# Patient Record
Sex: FEMALE | Race: WHITE | NOT HISPANIC OR LATINO | Employment: FULL TIME | URBAN - NONMETROPOLITAN AREA
[De-identification: names, ages, dates, MRNs, and addresses within clinical notes are randomized per-mention and may not be internally consistent; named-entity substitution may affect disease eponyms.]

---

## 2019-06-03 ENCOUNTER — OFFICE VISIT (OUTPATIENT)
Dept: PODIATRY | Facility: CLINIC | Age: 44
End: 2019-06-03

## 2019-06-03 VITALS — WEIGHT: 240.2 LBS | OXYGEN SATURATION: 96 % | BODY MASS INDEX: 38.6 KG/M2 | HEIGHT: 66 IN | HEART RATE: 106 BPM

## 2019-06-03 DIAGNOSIS — M79.672 LEFT FOOT PAIN: Primary | ICD-10-CM

## 2019-06-03 DIAGNOSIS — M85.872 OSTEOPENIA OF LEFT FOOT: ICD-10-CM

## 2019-06-03 DIAGNOSIS — R52 PAIN: ICD-10-CM

## 2019-06-03 DIAGNOSIS — R93.7 BONE MARROW EDEMA: ICD-10-CM

## 2019-06-03 PROCEDURE — 99204 OFFICE O/P NEW MOD 45 MIN: CPT | Performed by: PODIATRIST

## 2019-06-03 RX ORDER — ESTRADIOL 1 MG/1
TABLET ORAL
Refills: 3 | COMMUNITY
Start: 2019-05-09

## 2019-06-03 NOTE — PROGRESS NOTES
Vicky Barajas  1975  43 y.o. female     Patient presents to clinic today with complaint of left foot pain.    2019  Chief Complaint   Patient presents with   • Left Foot - Pain           History of Present Illness    Vicky Barajas is a 43 y.o. female who presents for evaluation of left foot and ankle pain.  Patient states that this pain has been ongoing since December of last year.  She describes the pain is achy and at times throbbing.  The pain is worse with weightbearing and activity.  She denies any known trauma or injuries.  She did initially see an orthopedist in [place] who put her into an offloading cam boot and sent her for an MRI.  She does have her MRI with her today.  She states that she was told she had inflammation in her bones and was placed nonweightbearing for a few weeks on a knee scooter and subsequently referred to [facility] when her pain did not improve.  She was then transition to a tall cam boot but none of her symptoms improved.  She also took a Medrol Dosepak and was placed on one nonsteroidal anti-inflammatory again with no improvement of her symptoms.  She denies any other prior treatments.      Past Medical History:   Diagnosis Date   • Acid reflux    • Allergic    • Bronchitis, chronic (CMS/HCC)    • Callus    • Kidney stone    • Migraines    • Sinusitis    • Strep throat    • Urinary tract infection          Past Surgical History:   Procedure Laterality Date   •  SECTION     • HYSTERECTOMY           Family History   Problem Relation Age of Onset   • Heart disease Father    • Hypertension Father    • Hypertension Brother    • Diabetes Paternal Grandmother    • Cancer Other          Social History     Socioeconomic History   • Marital status: Single     Spouse name: Not on file   • Number of children: Not on file   • Years of education: Not on file   • Highest education level: Not on file   Tobacco Use   • Smoking status: Never Smoker         Current Outpatient  "Medications   Medication Sig Dispense Refill   • estradiol (ESTRACE) 1 MG tablet   3     No current facility-administered medications for this visit.          OBJECTIVE    Pulse 106   Ht 167.6 cm (66\")   Wt 109 kg (240 lb 3.2 oz)   SpO2 96%   BMI 38.77 kg/m²       Review of Systems   Constitutional: Negative.    HENT: Negative.    Eyes: Negative.    Respiratory: Negative.    Cardiovascular: Negative.    Gastrointestinal: Negative.    Endocrine: Negative.    Genitourinary: Negative.    Musculoskeletal:        Foot pain   Skin: Negative.    Allergic/Immunologic: Negative.    Neurological: Negative.    Hematological: Negative.    Psychiatric/Behavioral: Negative.          Physical Exam   Eyes: EOM are normal. Pupils are equal, round, and reactive to light.   Neck: Neck supple.   Abdominal: Soft. She exhibits no distension.   Lymphadenopathy:     She has no cervical adenopathy.   Constitutional: she appears well-developed and well-nourished.   HEENT: Normocephalic. Atraumatic.  CV: No CP. RRR  Resp: Non-labored respirations.  Psychiatric: she has a normal mood and affect. her behavior is normal.         Lower Extremity Exam:  Vascular: DP/PT pulses palpable 2+.   No edema  Foot warm  Neuro: Protective sensation intact, b/l.  DTRs intact  Negative Tinel over tarsal tunnel  Integument: No open wounds or lesions.  No erythema, scaling  No masses  No ecchymosis  Musculoskeletal: LE muscle strength 5/5.   Gait normal  Ankle ROM decreased without pain or crepitus  STJ ROM full without pain or crepitus  Peroneals, posterior tibial and Achilles tendons are intact without significant tenderness palpation.  Tenderness palpation of talar neck, talar dome with foot held in plantarflexion.  No crepitus or instability noted.  Mild tenderness to palpation of plantar arch.  No significant tenderness to plantar calcaneal tubercles.              ASSESSMENT AND PLAN    Vicky was seen today for pain.    Diagnoses and all orders for " this visit:    Left foot pain    Pain  -     XR Foot 3+ View Left    Bone marrow edema  -     DEXA Bone Density Axial; Future    Osteopenia of left foot  -     DEXA Bone Density Axial; Future      -Comprehensive foot and ankle exam.  -Prior MRI images from January 2019 as well as radiographs today were reviewed.  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  -Continue with Cam boot, NSAIDs for symptomatic relief as needed.  -We will obtain DEXA scan to rule out underlying bone density pathology.  -Consider bisphosphonate if symptoms fail to improve.  -Recheck following DEXA scan.          This document has been electronically signed by Shay Caba DPM on Juana 3, 2019 4:51 PM     EMR Dragon/Transcription disclaimer:   Much of this encounter note is an electronic transcription/translation of spoken language to printed text. The electronic translation of spoken language may permit erroneous, or at times, nonsensical words or phrases to be inadvertently transcribed; Although I have reviewed the note for such errors, some may still exist.    Shay Caba DPM  6/3/2019  4:51 PM

## 2019-08-08 ENCOUNTER — OFFICE VISIT (OUTPATIENT)
Dept: PODIATRY | Facility: CLINIC | Age: 44
End: 2019-08-08

## 2019-08-08 VITALS — WEIGHT: 240.2 LBS | BODY MASS INDEX: 38.6 KG/M2 | OXYGEN SATURATION: 98 % | HEART RATE: 81 BPM | HEIGHT: 66 IN

## 2019-08-08 DIAGNOSIS — M79.672 LEFT FOOT PAIN: ICD-10-CM

## 2019-08-08 DIAGNOSIS — M85.872 OSTEOPENIA OF LEFT FOOT: ICD-10-CM

## 2019-08-08 DIAGNOSIS — R93.7 BONE MARROW EDEMA: Primary | ICD-10-CM

## 2019-08-08 PROCEDURE — 99213 OFFICE O/P EST LOW 20 MIN: CPT | Performed by: PODIATRIST

## 2019-08-08 RX ORDER — MELOXICAM 15 MG/1
15 TABLET ORAL DAILY
Qty: 30 TABLET | Refills: 0 | Status: SHIPPED | OUTPATIENT
Start: 2019-08-08

## 2019-08-08 NOTE — PROGRESS NOTES
"Vicky Barajas  1975  44 y.o. female     Patient presents to clinic today with complaint of left foot pain.    2019    Chief Complaint   Patient presents with   • Left Foot - Follow-up           History of Present Illness    Vicky Barajas is a 44 y.o. female who presents for follow-up evaluation of chronic left foot pain and abnormal bone marrow edema seen on MRI.  She has had DEXA scan since last visit.  She does note that her pain is still present however seems mildly improved overall.    Past Medical History:   Diagnosis Date   • Acid reflux    • Allergic    • Bronchitis, chronic (CMS/HCC)    • Callus    • Kidney stone    • Migraines    • Sinusitis    • Strep throat    • Urinary tract infection          Past Surgical History:   Procedure Laterality Date   •  SECTION     • HYSTERECTOMY           Family History   Problem Relation Age of Onset   • Heart disease Father    • Hypertension Father    • Hypertension Brother    • Diabetes Paternal Grandmother    • Cancer Other          Social History     Socioeconomic History   • Marital status: Single     Spouse name: Not on file   • Number of children: Not on file   • Years of education: Not on file   • Highest education level: Not on file   Tobacco Use   • Smoking status: Never Smoker         Current Outpatient Medications   Medication Sig Dispense Refill   • estradiol (ESTRACE) 1 MG tablet   3   • meloxicam (MOBIC) 15 MG tablet Take 1 tablet by mouth Daily. Take once daily. 30 tablet 0     No current facility-administered medications for this visit.          OBJECTIVE    Pulse 81   Ht 167.6 cm (66\")   Wt 109 kg (240 lb 3.2 oz)   SpO2 98%   BMI 38.77 kg/m²       Review of Systems   Constitutional: Negative.    HENT: Negative.    Eyes: Negative.    Respiratory: Negative.    Cardiovascular: Negative.    Gastrointestinal: Negative.    Endocrine: Negative.    Genitourinary: Negative.    Musculoskeletal:        Foot pain   Skin: Negative.  "   Allergic/Immunologic: Negative.    Neurological: Negative.    Hematological: Negative.    Psychiatric/Behavioral: Negative.          Physical Exam   Eyes: EOM are normal. Pupils are equal, round, and reactive to light.   Neck: Neck supple.   Abdominal: Soft. She exhibits no distension.   Lymphadenopathy:     She has no cervical adenopathy.   Constitutional: she appears well-developed and well-nourished.   HEENT: Normocephalic. Atraumatic.  CV: No CP. RRR  Resp: Non-labored respirations.  Psychiatric: she has a normal mood and affect. her behavior is normal.         Lower Extremity Exam:  Vascular: DP/PT pulses palpable 2+.   No edema  Foot warm  Neuro: Protective sensation intact, b/l.  DTRs intact  Negative Tinel over tarsal tunnel  Integument: No open wounds or lesions.  No erythema, scaling  No masses  No ecchymosis  Musculoskeletal: LE muscle strength 5/5.   Gait normal  Ankle ROM decreased without pain or crepitus  STJ ROM full without pain or crepitus  Peroneals, posterior tibial and Achilles tendons are intact without significant tenderness palpation.  Tenderness palpation of dorsal lateral midfoot.  No crepitus or instability noted.  Improving compared to last exam  Mild tenderness to palpation of plantar arch.  No significant tenderness to plantar calcaneal tubercles.      PROCEDURE: DEXA SCAN     CLINICAL INDICATION: Bone marrow edema syndrome, left foot and  ankle. No prior study, D75.89 Other specified diseases of blood  and blood-forming organs M85.872 Other specified disorders of  bone density and structure, left ankle and foot     COMPARISON: None     PROCEDURE/TECHNIQUE:  Multiple images of the bilateral hip and lumbar spine were  obtained using dual absorption technique.     FINDINGS:  The exam is performed using the [employer] QDR-4500 SL unit.  Images  are of satisfactory quality.     Total Lumbar spine:   1.021  gm/cm2     T-Score -0.2      Total Left Hip femoral neck:            0.763  gm/cm2      T-Score  -0.8   Total Right Hip femoral neck:            0.786  gm/cm2      T-Score -0.6      IMPRESSION:  CONCLUSION: The patient has normal bone mineral density.        WORLD HEALTH ORGANIZATION CRITERIA FOR OSTEOPOROSIS     DIAGNOSTIC CATEGORY    T-SCORE  Normal......................................................0 to  - 1.0  Osteopenia..............................................-1.0 to  -2.5  Osteoporosis...........................................Greater  than -2.5 (no history of fragility fractures)*  Established Osteoporosis........................Greater than -2.5  (with history of fragility fractures)*     *FRAGILITY FRACTURES: VERTEBRAE, HUMERUS, WRIST, HIP (OVER 40  YEARS OF AGE)     Electronically signed by:  Az Barksdale MD  7/11/2019 12:48 PM CDT  Workstation: [id number]        ASSESSMENT AND PLAN    Ginger was seen today for follow-up.    Diagnoses and all orders for this visit:    Bone marrow edema    Osteopenia of left foot    Left foot pain    Other orders  -     meloxicam (MOBIC) 15 MG tablet; Take 1 tablet by mouth Daily. Take once daily.      -Comprehensive foot and ankle exam.  -Prior MRI images from January 2019 as well as radiographs today were reviewed.  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  -DEXA scan negative for generalized osteoporosis.  No indication for bisphosphonates at this time.  Continue vitamin D/calcium supplementation  -Will add meloxicam daily for treatment of underlying osteoarthritis  -Activity as tolerated  -Recheck as needed            This document has been electronically signed by Shay Caba DPM on August 13, 2019 7:50 AM     EMR Dragon/Transcription disclaimer:   Much of this encounter note is an electronic transcription/translation of spoken language to printed text. The electronic translation of spoken language may permit erroneous, or at times, nonsensical words or phrases to be inadvertently  transcribed; Although I have reviewed the note for such errors, some may still exist.    Shay Caba DPM  8/13/2019  7:50 AM

## 2021-02-16 ENCOUNTER — HOSPITAL ENCOUNTER (EMERGENCY)
Facility: HOSPITAL | Age: 46
Discharge: HOME | End: 2021-02-16
Attending: EMERGENCY MEDICINE | Admitting: STUDENT IN AN ORGANIZED HEALTH CARE EDUCATION/TRAINING PROGRAM

## 2021-02-16 ENCOUNTER — APPOINTMENT (OUTPATIENT)
Dept: CT IMAGING | Facility: HOSPITAL | Age: 46
End: 2021-02-16

## 2021-02-16 VITALS
HEIGHT: 64 IN | DIASTOLIC BLOOD PRESSURE: 87 MMHG | TEMPERATURE: 97.9 F | RESPIRATION RATE: 18 BRPM | HEART RATE: 81 BPM | SYSTOLIC BLOOD PRESSURE: 184 MMHG | WEIGHT: 262.8 LBS | OXYGEN SATURATION: 97 % | BODY MASS INDEX: 44.86 KG/M2

## 2021-02-16 DIAGNOSIS — N13.30 HYDROURETERONEPHROSIS: Primary | ICD-10-CM

## 2021-02-16 DIAGNOSIS — N20.0 KIDNEY STONE: ICD-10-CM

## 2021-02-16 LAB
ALBUMIN SERPL-MCNC: 4.2 G/DL (ref 3.5–5.2)
ALBUMIN/GLOB SERPL: 1.4 G/DL
ALP SERPL-CCNC: 130 U/L (ref 39–117)
ALT SERPL W P-5'-P-CCNC: 18 U/L (ref 1–33)
ANION GAP SERPL CALCULATED.3IONS-SCNC: 8 MMOL/L (ref 5–15)
AST SERPL-CCNC: 16 U/L (ref 1–32)
BACTERIA UR QL AUTO: ABNORMAL /HPF
BASOPHILS # BLD AUTO: 0.09 10*3/MM3 (ref 0–0.2)
BASOPHILS NFR BLD AUTO: 0.6 % (ref 0–1.5)
BILIRUB SERPL-MCNC: 0.2 MG/DL (ref 0–1.2)
BILIRUB UR QL STRIP: NEGATIVE
BUN SERPL-MCNC: 12 MG/DL (ref 6–20)
BUN/CREAT SERPL: 13.8 (ref 7–25)
CALCIUM SPEC-SCNC: 9.5 MG/DL (ref 8.6–10.5)
CHLORIDE SERPL-SCNC: 100 MMOL/L (ref 98–107)
CLARITY UR: CLEAR
CO2 SERPL-SCNC: 29 MMOL/L (ref 22–29)
COLOR UR: YELLOW
CREAT SERPL-MCNC: 0.87 MG/DL (ref 0.57–1)
DEPRECATED RDW RBC AUTO: 42.4 FL (ref 37–54)
EOSINOPHIL # BLD AUTO: 0.08 10*3/MM3 (ref 0–0.4)
EOSINOPHIL NFR BLD AUTO: 0.6 % (ref 0.3–6.2)
ERYTHROCYTE [DISTWIDTH] IN BLOOD BY AUTOMATED COUNT: 12.8 % (ref 12.3–15.4)
GFR SERPL CREATININE-BSD FRML MDRD: 71 ML/MIN/1.73
GLOBULIN UR ELPH-MCNC: 3.1 GM/DL
GLUCOSE SERPL-MCNC: 155 MG/DL (ref 65–99)
GLUCOSE UR STRIP-MCNC: ABNORMAL MG/DL
HCT VFR BLD AUTO: 42.9 % (ref 34–46.6)
HGB BLD-MCNC: 14.5 G/DL (ref 12–15.9)
HGB UR QL STRIP.AUTO: ABNORMAL
HOLD SPECIMEN: NORMAL
HOLD SPECIMEN: NORMAL
HYALINE CASTS UR QL AUTO: ABNORMAL /LPF
IMM GRANULOCYTES # BLD AUTO: 0.06 10*3/MM3 (ref 0–0.05)
IMM GRANULOCYTES NFR BLD AUTO: 0.4 % (ref 0–0.5)
KETONES UR QL STRIP: NEGATIVE
LEUKOCYTE ESTERASE UR QL STRIP.AUTO: NEGATIVE
LIPASE SERPL-CCNC: 23 U/L (ref 13–60)
LYMPHOCYTES # BLD AUTO: 1.94 10*3/MM3 (ref 0.7–3.1)
LYMPHOCYTES NFR BLD AUTO: 14 % (ref 19.6–45.3)
MCH RBC QN AUTO: 30.5 PG (ref 26.6–33)
MCHC RBC AUTO-ENTMCNC: 33.8 G/DL (ref 31.5–35.7)
MCV RBC AUTO: 90.3 FL (ref 79–97)
MONOCYTES # BLD AUTO: 0.68 10*3/MM3 (ref 0.1–0.9)
MONOCYTES NFR BLD AUTO: 4.9 % (ref 5–12)
NEUTROPHILS NFR BLD AUTO: 11.04 10*3/MM3 (ref 1.7–7)
NEUTROPHILS NFR BLD AUTO: 79.5 % (ref 42.7–76)
NITRITE UR QL STRIP: NEGATIVE
NRBC BLD AUTO-RTO: 0 /100 WBC (ref 0–0.2)
PH UR STRIP.AUTO: 7.5 [PH] (ref 5–9)
PLATELET # BLD AUTO: 288 10*3/MM3 (ref 140–450)
PMV BLD AUTO: 11.9 FL (ref 6–12)
POTASSIUM SERPL-SCNC: 4.5 MMOL/L (ref 3.5–5.2)
PROT SERPL-MCNC: 7.3 G/DL (ref 6–8.5)
PROT UR QL STRIP: NEGATIVE
RBC # BLD AUTO: 4.75 10*6/MM3 (ref 3.77–5.28)
RBC # UR: ABNORMAL /HPF
REF LAB TEST METHOD: ABNORMAL
SODIUM SERPL-SCNC: 137 MMOL/L (ref 136–145)
SP GR UR STRIP: 1.09 (ref 1–1.03)
SQUAMOUS #/AREA URNS HPF: ABNORMAL /HPF
UROBILINOGEN UR QL STRIP: ABNORMAL
WBC # BLD AUTO: 13.89 10*3/MM3 (ref 3.4–10.8)
WBC UR QL AUTO: ABNORMAL /HPF
WHOLE BLOOD HOLD SPECIMEN: NORMAL
WHOLE BLOOD HOLD SPECIMEN: NORMAL

## 2021-02-16 PROCEDURE — 96375 TX/PRO/DX INJ NEW DRUG ADDON: CPT

## 2021-02-16 PROCEDURE — 85025 COMPLETE CBC W/AUTO DIFF WBC: CPT | Performed by: EMERGENCY MEDICINE

## 2021-02-16 PROCEDURE — 74177 CT ABD & PELVIS W/CONTRAST: CPT

## 2021-02-16 PROCEDURE — 25010000002 KETOROLAC TROMETHAMINE PER 15 MG: Performed by: EMERGENCY MEDICINE

## 2021-02-16 PROCEDURE — 96376 TX/PRO/DX INJ SAME DRUG ADON: CPT

## 2021-02-16 PROCEDURE — 83690 ASSAY OF LIPASE: CPT | Performed by: EMERGENCY MEDICINE

## 2021-02-16 PROCEDURE — 99284 EMERGENCY DEPT VISIT MOD MDM: CPT

## 2021-02-16 PROCEDURE — 96374 THER/PROPH/DIAG INJ IV PUSH: CPT

## 2021-02-16 PROCEDURE — 25010000002 ONDANSETRON PER 1 MG: Performed by: EMERGENCY MEDICINE

## 2021-02-16 PROCEDURE — 25010000002 IOPAMIDOL 61 % SOLUTION: Performed by: EMERGENCY MEDICINE

## 2021-02-16 PROCEDURE — 81001 URINALYSIS AUTO W/SCOPE: CPT | Performed by: EMERGENCY MEDICINE

## 2021-02-16 PROCEDURE — 80053 COMPREHEN METABOLIC PANEL: CPT | Performed by: EMERGENCY MEDICINE

## 2021-02-16 PROCEDURE — 25010000002 MORPHINE PER 10 MG: Performed by: EMERGENCY MEDICINE

## 2021-02-16 RX ORDER — SODIUM CHLORIDE 0.9 % (FLUSH) 0.9 %
10 SYRINGE (ML) INJECTION AS NEEDED
Status: DISCONTINUED | OUTPATIENT
Start: 2021-02-16 | End: 2021-02-17 | Stop reason: HOSPADM

## 2021-02-16 RX ORDER — ONDANSETRON 4 MG/1
4 TABLET, ORALLY DISINTEGRATING ORAL 4 TIMES DAILY PRN
Qty: 12 TABLET | Refills: 0 | Status: SHIPPED | OUTPATIENT
Start: 2021-02-16

## 2021-02-16 RX ORDER — HYDROCODONE BITARTRATE AND ACETAMINOPHEN 10; 325 MG/1; MG/1
1 TABLET ORAL ONCE
Status: COMPLETED | OUTPATIENT
Start: 2021-02-16 | End: 2021-02-16

## 2021-02-16 RX ORDER — KETOROLAC TROMETHAMINE 10 MG/1
10 TABLET, FILM COATED ORAL EVERY 6 HOURS PRN
Qty: 12 TABLET | Refills: 0 | Status: SHIPPED | OUTPATIENT
Start: 2021-02-16

## 2021-02-16 RX ORDER — ONDANSETRON 2 MG/ML
4 INJECTION INTRAMUSCULAR; INTRAVENOUS ONCE
Status: COMPLETED | OUTPATIENT
Start: 2021-02-16 | End: 2021-02-16

## 2021-02-16 RX ORDER — KETOROLAC TROMETHAMINE 30 MG/ML
30 INJECTION, SOLUTION INTRAMUSCULAR; INTRAVENOUS ONCE
Status: COMPLETED | OUTPATIENT
Start: 2021-02-16 | End: 2021-02-16

## 2021-02-16 RX ADMIN — MORPHINE SULFATE 4 MG: 4 INJECTION INTRAVENOUS at 21:03

## 2021-02-16 RX ADMIN — ONDANSETRON HYDROCHLORIDE 4 MG: 2 INJECTION, SOLUTION INTRAMUSCULAR; INTRAVENOUS at 21:01

## 2021-02-16 RX ADMIN — KETOROLAC TROMETHAMINE 30 MG: 30 INJECTION, SOLUTION INTRAMUSCULAR; INTRAVENOUS at 21:58

## 2021-02-16 RX ADMIN — SODIUM CHLORIDE 1000 ML: 900 INJECTION, SOLUTION INTRAVENOUS at 21:01

## 2021-02-16 RX ADMIN — HYDROCODONE BITARTRATE AND ACETAMINOPHEN 1 TABLET: 10; 325 TABLET ORAL at 23:52

## 2021-02-16 RX ADMIN — MORPHINE SULFATE 4 MG: 4 INJECTION INTRAVENOUS at 21:59

## 2021-02-16 RX ADMIN — IOPAMIDOL 90 ML: 612 INJECTION, SOLUTION INTRAVENOUS at 21:17

## 2021-02-17 NOTE — ED PROVIDER NOTES
Subjective   45-year-old female presents with right lower quadrant pain that was sudden of onset about 3 PM.  Has worsened.  Radiates up both sides to her back.  Denies any history of kidney stones.  She is status post total hysterectomy and believes her appendix was removed as well.  Denies any urinary symptoms, fever or chills.  Reports nausea and vomiting x1 episode and denies any diarrhea or constipation.    Family history, surgical history, social history, current medications and allergies are reviewed with the patient and triage documentation and vitals are reviewed.      History provided by:  Patient and significant other   used: No        Review of Systems   Constitutional: Negative for chills and fever.   HENT: Negative for congestion and sore throat.    Eyes: Negative for photophobia and visual disturbance.   Respiratory: Negative for cough, shortness of breath and wheezing.    Cardiovascular: Negative for chest pain, palpitations and leg swelling.   Gastrointestinal: Positive for abdominal pain, nausea and vomiting. Negative for constipation and diarrhea.   Endocrine: Negative for polydipsia, polyphagia and polyuria.   Genitourinary: Negative for dysuria, frequency and urgency.   Musculoskeletal: Negative for arthralgias, back pain, myalgias and neck pain.   Skin: Negative for color change, pallor, rash and wound.   Allergic/Immunologic: Negative.    Neurological: Negative.    Hematological: Negative.    Psychiatric/Behavioral: Negative.        Past Medical History:   Diagnosis Date   • Acid reflux    • Allergic    • Bronchitis, chronic (CMS/HCC)    • Callus    • Kidney stone    • Migraines    • Sinusitis    • Strep throat    • Urinary tract infection        Allergies   Allergen Reactions   • Penicillins Other (See Comments)     Vaginal infection   Pt states all cillin family    • Amoxicillin    • Ceftin [Cefuroxime]        Past Surgical History:   Procedure Laterality Date   •   SECTION     • HYSTERECTOMY     • OOPHORECTOMY         Family History   Problem Relation Age of Onset   • Heart disease Father    • Hypertension Father    • Hypertension Brother    • Diabetes Paternal Grandmother    • Cancer Other        Social History     Socioeconomic History   • Marital status: Single     Spouse name: Not on file   • Number of children: Not on file   • Years of education: Not on file   • Highest education level: Not on file   Tobacco Use   • Smoking status: Never Smoker           Objective   Physical Exam  Vitals signs and nursing note reviewed.   Constitutional:       General: She is not in acute distress.     Appearance: She is well-developed. She is obese. She is not ill-appearing, toxic-appearing or diaphoretic.   HENT:      Head: Normocephalic.      Mouth/Throat:      Mouth: Mucous membranes are moist.   Eyes:      General: No scleral icterus.     Pupils: Pupils are equal, round, and reactive to light.   Cardiovascular:      Rate and Rhythm: Normal rate and regular rhythm.      Heart sounds: Normal heart sounds. No murmur.   Pulmonary:      Effort: Pulmonary effort is normal. No respiratory distress.      Breath sounds: Normal breath sounds. No wheezing or rhonchi.   Abdominal:      General: Bowel sounds are decreased. There is no distension.      Palpations: Abdomen is soft. There is no hepatomegaly or splenomegaly.      Tenderness: There is abdominal tenderness in the right lower quadrant. There is guarding. There is no rebound. Negative signs include Edgar's sign and McBurney's sign.      Hernia: No hernia is present.   Skin:     General: Skin is warm and dry.      Capillary Refill: Capillary refill takes less than 2 seconds.      Coloration: Skin is not jaundiced or pale.   Neurological:      General: No focal deficit present.      Mental Status: She is alert and oriented to person, place, and time.   Psychiatric:         Mood and Affect: Mood normal.         Behavior: Behavior  normal.         Procedures  none         ED Course  ED Course as of Feb 16 2349   Tue Feb 16, 2021 2348 Patient checked out to me by Dr. Bell at the end of her shift.  Labs are unremarkable.  UA shows a lot of blood, but negative leukocytes and nitrite.  Moderate hydronephrosis secondary to a 4 mm ureter stone.  Renal function is normal.  On reevaluation, patient is alert and resting comfortably. I discussed the results of the emergency department evaluation.  I recommended primary care and urology follow-up.  Return precautions discussed.    [BH]      ED Course User Index  [] Fernando Arroyo MD      Labs Reviewed   COMPREHENSIVE METABOLIC PANEL - Abnormal; Notable for the following components:       Result Value    Glucose 155 (*)     Alkaline Phosphatase 130 (*)     All other components within normal limits    Narrative:     GFR Normal >60  Chronic Kidney Disease <60  Kidney Failure <15     URINALYSIS W/ MICROSCOPIC IF INDICATED (NO CULTURE) - Abnormal; Notable for the following components:    Specific Gravity, UA 1.092 (*)     Glucose,  mg/dL (Trace) (*)     Blood, UA Large (3+) (*)     All other components within normal limits   CBC WITH AUTO DIFFERENTIAL - Abnormal; Notable for the following components:    WBC 13.89 (*)     Neutrophil % 79.5 (*)     Lymphocyte % 14.0 (*)     Monocyte % 4.9 (*)     Neutrophils, Absolute 11.04 (*)     Immature Grans, Absolute 0.06 (*)     All other components within normal limits   URINALYSIS, MICROSCOPIC ONLY - Abnormal; Notable for the following components:    RBC, UA Too Numerous to Count (*)     Squamous Epithelial Cells, UA 3-5 (*)     All other components within normal limits   LIPASE - Normal   RAINBOW DRAW    Narrative:     The following orders were created for panel order [place] Draw.  Procedure                               Abnormality         Status                     ---------                               -----------         ------                      Light Blue Top[[id number]]                                   Final result               Green Top (Gel)[[id number]]                                  Final result               Lavender Top[[id number]]                                     Final result               Gold Top - SST[[id number]]                                   Final result                 Please view results for these tests on the individual orders.   CBC AND DIFFERENTIAL    Narrative:     The following orders were created for panel order CBC & Differential.  Procedure                               Abnormality         Status                     ---------                               -----------         ------                     CBC Auto Differential[[id number]]        Abnormal            Final result                 Please view results for these tests on the individual orders.   LIGHT BLUE TOP   GREEN TOP   LAVENDER TOP   GOLD TOP - SST     Ct Abdomen Pelvis With Contrast    Result Date: 2/16/2021  Narrative: PROCEDURE: CT ABDOMEN PELVIS W CONTRAST CLINICAL HISTORY: 45 years Female RLQ and right mid abd pain, n, vomiting TECHNIQUE: Contiguous axial images obtained through the abdomen and pelvis after IV contrast administration. Coronal and sagittal reformatted images provided. This CT exam was performed according to our departmental dose-optimization program, which includes one or more of the following dose reduction techniques: automated exposure control, adjustment of the mA and/or kV according to patient size, and/or use of iterative reconstruction technique. COMPARISON: No prior exams provided for comparison. FINDINGS: There is moderate right hydroureteronephrosis due to a 4 mm right distal ureteral calculus. No other urinary calculi. Delayed right nephrogram without definite evidence of pyelonephritis. Normal left kidney and urinary bladder. Minimal bibasilar atelectasis. The liver, biliary tree, gallbladder, pancreas, spleen, and adrenal glands are  normal. Prior hysterectomy and suspected appendectomy. There is no bowel inflammation, obstruction, free intraperitoneal air, or ascites. There are no aggressive osseous lesions.     Impression: Moderate right hydroureteronephrosis due to a 4 mm right distal ureteral calculus. Delayed right nephrogram without definite pyelonephritis. No other urinary calculi. No other acute abdominal or pelvic findings. Electronically signed by:  Kayla Rao MD  2/16/2021 10:00 PM CST Workstation: [id number]          MDM  Number of Diagnoses or Management Options     Amount and/or Complexity of Data Reviewed  Clinical lab tests: reviewed  Tests in the radiology section of CPT®: reviewed    Patient Progress  Patient progress: stable    Patient with 4 mm right distal stone with some hydroureteronephrosis.  Concern for possible pyelonephritis.  Urine is still pending.  Remainder of labs unremarkable.  Mild leukocytosis.  Patient will be signed out to Dr. Arroyo pending urinalysis for disposition.  Please see his documentation.    Final diagnoses:   Hydroureteronephrosis   Kidney stone            Fernando Arroyo MD  02/16/21 0248